# Patient Record
Sex: FEMALE | Race: OTHER | HISPANIC OR LATINO | URBAN - METROPOLITAN AREA
[De-identification: names, ages, dates, MRNs, and addresses within clinical notes are randomized per-mention and may not be internally consistent; named-entity substitution may affect disease eponyms.]

---

## 2019-02-06 ENCOUNTER — EMERGENCY (EMERGENCY)
Facility: HOSPITAL | Age: 34
LOS: 0 days | Discharge: HOME | End: 2019-02-07
Attending: EMERGENCY MEDICINE | Admitting: EMERGENCY MEDICINE

## 2019-02-06 VITALS
TEMPERATURE: 98 F | DIASTOLIC BLOOD PRESSURE: 62 MMHG | SYSTOLIC BLOOD PRESSURE: 109 MMHG | RESPIRATION RATE: 20 BRPM | HEART RATE: 59 BPM | OXYGEN SATURATION: 99 %

## 2019-02-06 DIAGNOSIS — Z3A.09 9 WEEKS GESTATION OF PREGNANCY: ICD-10-CM

## 2019-02-06 DIAGNOSIS — O20.9 HEMORRHAGE IN EARLY PREGNANCY, UNSPECIFIED: ICD-10-CM

## 2019-02-06 DIAGNOSIS — O21.9 VOMITING OF PREGNANCY, UNSPECIFIED: ICD-10-CM

## 2019-02-06 DIAGNOSIS — N93.9 ABNORMAL UTERINE AND VAGINAL BLEEDING, UNSPECIFIED: ICD-10-CM

## 2019-02-06 LAB
ALBUMIN SERPL ELPH-MCNC: 4.4 G/DL — SIGNIFICANT CHANGE UP (ref 3.5–5.2)
ALP SERPL-CCNC: 74 U/L — SIGNIFICANT CHANGE UP (ref 30–115)
ALT FLD-CCNC: 28 U/L — SIGNIFICANT CHANGE UP (ref 0–41)
ANION GAP SERPL CALC-SCNC: 17 MMOL/L — HIGH (ref 7–14)
APPEARANCE UR: CLEAR — SIGNIFICANT CHANGE UP
AST SERPL-CCNC: 18 U/L — SIGNIFICANT CHANGE UP (ref 0–41)
BACTERIA # UR AUTO: ABNORMAL /HPF
BILIRUB SERPL-MCNC: 0.3 MG/DL — SIGNIFICANT CHANGE UP (ref 0.2–1.2)
BILIRUB UR-MCNC: NEGATIVE — SIGNIFICANT CHANGE UP
BUN SERPL-MCNC: 15 MG/DL — SIGNIFICANT CHANGE UP (ref 10–20)
CALCIUM SERPL-MCNC: 9.3 MG/DL — SIGNIFICANT CHANGE UP (ref 8.5–10.1)
CHLORIDE SERPL-SCNC: 99 MMOL/L — SIGNIFICANT CHANGE UP (ref 98–110)
CO2 SERPL-SCNC: 23 MMOL/L — SIGNIFICANT CHANGE UP (ref 17–32)
COLOR SPEC: YELLOW — SIGNIFICANT CHANGE UP
COMMENT - URINE: SIGNIFICANT CHANGE UP
CREAT SERPL-MCNC: 0.5 MG/DL — LOW (ref 0.7–1.5)
DIFF PNL FLD: ABNORMAL
EPI CELLS # UR: ABNORMAL /HPF
GLUCOSE SERPL-MCNC: 122 MG/DL — HIGH (ref 70–99)
GLUCOSE UR QL: NEGATIVE — SIGNIFICANT CHANGE UP
HCG SERPL-ACNC: 378.5 MIU/ML — HIGH
KETONES UR-MCNC: ABNORMAL
LEUKOCYTE ESTERASE UR-ACNC: NEGATIVE — SIGNIFICANT CHANGE UP
NITRITE UR-MCNC: NEGATIVE — SIGNIFICANT CHANGE UP
PH UR: 6 — SIGNIFICANT CHANGE UP (ref 5–8)
POTASSIUM SERPL-MCNC: 3.8 MMOL/L — SIGNIFICANT CHANGE UP (ref 3.5–5)
POTASSIUM SERPL-SCNC: 3.8 MMOL/L — SIGNIFICANT CHANGE UP (ref 3.5–5)
PROT SERPL-MCNC: 6.8 G/DL — SIGNIFICANT CHANGE UP (ref 6–8)
PROT UR-MCNC: ABNORMAL
RBC CASTS # UR COMP ASSIST: ABNORMAL /HPF
SODIUM SERPL-SCNC: 139 MMOL/L — SIGNIFICANT CHANGE UP (ref 135–146)
SP GR SPEC: >=1.03 — SIGNIFICANT CHANGE UP (ref 1.01–1.03)
UROBILINOGEN FLD QL: 1 (ref 0.2–0.2)
WBC UR QL: SIGNIFICANT CHANGE UP /HPF

## 2019-02-06 RX ORDER — ACETAMINOPHEN 500 MG
975 TABLET ORAL ONCE
Qty: 0 | Refills: 0 | Status: COMPLETED | OUTPATIENT
Start: 2019-02-06 | End: 2019-02-06

## 2019-02-06 RX ORDER — SODIUM CHLORIDE 9 MG/ML
1000 INJECTION INTRAMUSCULAR; INTRAVENOUS; SUBCUTANEOUS ONCE
Qty: 0 | Refills: 0 | Status: COMPLETED | OUTPATIENT
Start: 2019-02-06 | End: 2019-02-06

## 2019-02-06 RX ADMIN — SODIUM CHLORIDE 2000 MILLILITER(S): 9 INJECTION INTRAMUSCULAR; INTRAVENOUS; SUBCUTANEOUS at 22:33

## 2019-02-06 RX ADMIN — Medication 975 MILLIGRAM(S): at 23:15

## 2019-02-06 NOTE — ED ADULT NURSE NOTE - EXTENSIONS OF SELF_ADULT
Health Maintenance Due   Topic Date Due   • Hepatitis B Vaccine (3 of 3 - 3-dose primary series) 2018   • Influenza Vaccine (2 of 2) 2018       Patient is due for the topics as listed above and wishes to proceed with them. Hep B at 9 months.      Vaccine Information Statement(s) was given today. This has been reviewed, questions answered, and verbal consent given by Parent for injection(s) and administration of Influenza (Inactivated).    1. Does the patient have a moderate to severe fever?  No  2. Has the patient had a serious reaction to a flu shot before?   No  3. Has the patient ever had Guillian Calvert Syndrome within 6 weeks of a previous flu shot?  No  4. Does the patient have a serious allergy to eggs?  No  5. Is the patient less that 6 months of age?  No    Patient is eligible to receive the vaccine based on all questions being answered as 'No'.        Patient tolerated without incident. See immunization grid for documentation.     None

## 2019-02-06 NOTE — ED ADULT NURSE NOTE - OBJECTIVE STATEMENT
pt is A&Ox3, ambulatory, able to make needs known and presents with C/O abdominal pain with vaginal bleeding x 1 day. PT is about 6 weeks pregnant

## 2019-02-07 VITALS
RESPIRATION RATE: 18 BRPM | HEART RATE: 74 BPM | DIASTOLIC BLOOD PRESSURE: 55 MMHG | SYSTOLIC BLOOD PRESSURE: 101 MMHG | TEMPERATURE: 97 F | OXYGEN SATURATION: 98 %

## 2019-02-07 LAB
BASOPHILS # BLD AUTO: 0.03 K/UL — SIGNIFICANT CHANGE UP (ref 0–0.2)
BASOPHILS NFR BLD AUTO: 0.4 % — SIGNIFICANT CHANGE UP (ref 0–1)
BLD GP AB SCN SERPL QL: SIGNIFICANT CHANGE UP
EOSINOPHIL # BLD AUTO: 0.12 K/UL — SIGNIFICANT CHANGE UP (ref 0–0.7)
EOSINOPHIL NFR BLD AUTO: 1.7 % — SIGNIFICANT CHANGE UP (ref 0–8)
HCT VFR BLD CALC: 34.6 % — LOW (ref 37–47)
HGB BLD-MCNC: 11.8 G/DL — LOW (ref 12–16)
IMM GRANULOCYTES NFR BLD AUTO: 0.1 % — SIGNIFICANT CHANGE UP (ref 0.1–0.3)
LACTATE SERPL-SCNC: 0.6 MMOL/L — SIGNIFICANT CHANGE UP (ref 0.5–2.2)
LYMPHOCYTES # BLD AUTO: 2.51 K/UL — SIGNIFICANT CHANGE UP (ref 1.2–3.4)
LYMPHOCYTES # BLD AUTO: 35.5 % — SIGNIFICANT CHANGE UP (ref 20.5–51.1)
MCHC RBC-ENTMCNC: 31 PG — SIGNIFICANT CHANGE UP (ref 27–31)
MCHC RBC-ENTMCNC: 34.1 G/DL — SIGNIFICANT CHANGE UP (ref 32–37)
MCV RBC AUTO: 90.8 FL — SIGNIFICANT CHANGE UP (ref 81–99)
MONOCYTES # BLD AUTO: 0.61 K/UL — HIGH (ref 0.1–0.6)
MONOCYTES NFR BLD AUTO: 8.6 % — SIGNIFICANT CHANGE UP (ref 1.7–9.3)
NEUTROPHILS # BLD AUTO: 3.79 K/UL — SIGNIFICANT CHANGE UP (ref 1.4–6.5)
NEUTROPHILS NFR BLD AUTO: 53.7 % — SIGNIFICANT CHANGE UP (ref 42.2–75.2)
NRBC # BLD: 0 /100 WBCS — SIGNIFICANT CHANGE UP (ref 0–0)
PLATELET # BLD AUTO: 228 K/UL — SIGNIFICANT CHANGE UP (ref 130–400)
RBC # BLD: 3.81 M/UL — LOW (ref 4.2–5.4)
RBC # FLD: 12 % — SIGNIFICANT CHANGE UP (ref 11.5–14.5)
TYPE + AB SCN PNL BLD: SIGNIFICANT CHANGE UP
WBC # BLD: 7.07 K/UL — SIGNIFICANT CHANGE UP (ref 4.8–10.8)
WBC # FLD AUTO: 7.07 K/UL — SIGNIFICANT CHANGE UP (ref 4.8–10.8)

## 2019-02-07 RX ORDER — SODIUM CHLORIDE 9 MG/ML
1000 INJECTION INTRAMUSCULAR; INTRAVENOUS; SUBCUTANEOUS ONCE
Qty: 0 | Refills: 0 | Status: COMPLETED | OUTPATIENT
Start: 2019-02-07 | End: 2019-02-07

## 2019-02-07 RX ADMIN — SODIUM CHLORIDE 2000 MILLILITER(S): 9 INJECTION INTRAMUSCULAR; INTRAVENOUS; SUBCUTANEOUS at 00:39

## 2019-02-07 NOTE — CONSULT NOTE ADULT - ASSESSMENT
32 yo  at around 9w pregnant by approximate LMP, w/ pregnancy of unknown location, r/o abnormal pregnancy, normal pregnancy vs. ectopic, hemodynamically and clinically stable,     -F/u bhcg and progesterone on   -Ectopic pregnancy precautions given  -Disposition of patient per ED team    Dr. Pedraza at bedside. Dr. Calzada to be made aware.     NOT FINALIZED 32 yo  at around 9w GA by approximate LMP, w/ pregnancy of unknown location, r/o abnormal pregnancy vs. normal pregnancy vs. ectopic pregnancy, hemodynamically and clinically stable,     -F/u bhcg and progesterone on   -Ectopic pregnancy precautions given  -Disposition of patient per ED team    Dr. Pedraza at bedside. Dr. Calzada aware.      number [phone]

## 2019-02-07 NOTE — ED PROVIDER NOTE - NSFOLLOWUPCLINICS_GEN_ALL_ED_FT
[facility]  OB/GYN  [address] [place] [address]  Phone: [phone]  Fax:   Follow Up Time: 1-3 Days

## 2019-02-07 NOTE — ED PROVIDER NOTE - MEDICAL DECISION MAKING DETAILS
Pt with 1 day of vaginal bleeding, currently pregnant with no documented IUP. Labs wnl and US pelvis without IUP. Pt was evaluated by OB service. They spoke with her regarding f/up for beta hcg check. Discussed anticipatory guidance and return instructions with pt.

## 2019-02-07 NOTE — ED PROVIDER NOTE - OBJECTIVE STATEMENT
33 yr F, A1, about 9 wks pregnant with LMP being end of 2018, with complaints of suprapubic abd pain, associated nausea and nbnb vomiting. Bleeding started about 14 hrs prior, was very light and then turned into heavy bleeding. No CP, SOB, lightheadedness or LOC. Pt has not had IUP documented.   Hungarian speaker,  ID: [id number]

## 2019-02-07 NOTE — CONSULT NOTE ADULT - SUBJECTIVE AND OBJECTIVE BOX
LUIS MIGUEL DAVIS   33y   Female   [id number]    Chief Complaint: 8 am some discharge that at 1 with pain, intermittent sharp worse 8/10 intensity, clinic that diagnosed her     HPI:    MEDICATIONS  (STANDING):    MEDICATIONS  (PRN):      Allergies    No Known Allergies    Intolerances        PAST MEDICAL & SURGICAL HISTORY:      OB/GYN HISTORY:   LMP:            Cycle Length:              Days Flow:                                       Gyn: denies history of abnormal pap, STI, ovarian cysts, or uterine fibroids                                      Obstetric:  G  P                                        Last Pap smear:                                        Last mammogram:                                       Last Colonoscopy:    FAMILY HISTORY:      SOCIAL HISTORY: Denies cigarette use, alcohol use, or illicit drug use    REVIEW OF SYSTEMS:    CONSTITUTIONAL: No weakness, fevers or chills  EYES/ENT: No visual changes;  No vertigo or throat pain   NECK: No pain or stiffness  RESPIRATORY: No cough, wheezing, hemoptysis; No shortness of breath  CARDIOVASCULAR: No chest pain or palpitations  GASTROINTESTINAL: No abdominal or epigastric pain. No nausea, vomiting, or hematemesis; No diarrhea or constipation. No melena or hematochezia.  GENITOURINARY: No dysuria, frequency or hematuria  NEUROLOGICAL: No numbness or weakness  SKIN: No itching, rashes  All other negative        Vital Signs Last 24 Hrs  T(C): 36.7 (06 Feb 2019 19:49), Max: 36.7 (06 Feb 2019 19:49)  T(F): 98 (06 Feb 2019 19:49), Max: 98 (06 Feb 2019 19:49)  HR: 59 (06 Feb 2019 19:49) (59 - 59)  BP: 109/62 (06 Feb 2019 19:49) (109/62 - 109/62)  BP(mean): --  RR: 20 (06 Feb 2019 19:49) (20 - 20)  SpO2: 99% (06 Feb 2019 19:49) (99% - 99%)    Physical Exam:  Constitutional: AAOx3, NAD  Breasts: Normal, no masses, nipple discharge, or tenderness bilaterally  Respiratory: CTAB  Cardiovascular: NL S1/S2  Gastrointestinal: Soft, nontender, nondistended, no rebound, guarding, or rigidity  Pelvic: Normal vulva, normal vagina, no bleeding, Cervix normal, closed, no bleeding, no abnormal discharge, Uterus anteverted, normal sized, no fundal tenderness, no adnexal masses or tenderness  Rectal:    LABS:                        11.8   7.07  )-----------( 228      ( 07 Feb 2019 00:17 )             34.6     HCG Quantitative, Serum: 378.5 mIU/mL (02-06-19 @ 21:14)    Antibody Screen: NEG (02-06-19 @ 21:14)    02-06    139  |  99  |  15  ----------------------------<  122<H>  3.8   |  23  |  0.5<L>    Ca    9.3      06 Feb 2019 21:14    TPro  6.8  /  Alb  4.4  /  TBili  0.3  /  DBili  x   /  AST  18  /  ALT  28  /  AlkPhos  74  02-06      Urinalysis Basic - ( 06 Feb 2019 21:14 )    Color: Yellow / Appearance: Clear / SG: >=1.030 / pH: x  Gluc: x / Ketone: Trace  / Bili: Negative / Urobili: 1.0   Blood: x / Protein: Trace / Nitrite: Negative   Leuk Esterase: Negative / RBC: 11-25 /HPF / WBC 3-5 /HPF   Sq Epi: x / Non Sq Epi: Few /HPF / Bacteria: Moderate /HPF          RADIOLOGY & ADDITIONAL STUDIES: LUIS MIGUEL DAVIS   33y   Female   [id number]    Chief Complaint: Vaginal bleeding at 9w GA    HPI: 34 yo  at around 9w pregnant by approximate LMP of  here for vaginal bleeding and abdominal cramping. At 7 am pt started having orange, pinkish discharge. At 1 pm, her discharge turned into bleeding and she started having intermittent sharp lower pelvic pain that is radiating top her back. When her pain is worse, it is 8/10 intensity. Pt found out she was pregnant a couple of weeks ago when she went for a check up at [facility]. When she started having this pain and bleeding, she called the same clinic and was told to come to the ER. Desired and planned pregnancy. Pt denies HA, N/V, abnormal vaginal discharge, dysuria, frequency, urgency, palpitations, SOB, chest pain, sore throat, cough, sick contact and recent travel.     MEDICATIONS:  None    ALLERGIES:  NKDA    PAST MEDICAL & SURGICAL HISTORY:  H/o dilation and curettage   H/o appendectomy (laparoscopic)  H/o cholecystectomy (laparoscopic)    OB/GYN HISTORY:   LMP: End of , pt not sure  Gyn: denies history of abnormal pap, STI, ovarian cysts, or uterine fibroids  Obstetric: ; FT  x2, no complications; SABx 1 w/ D&C    FAMILY HISTORY:  No pertinent family history    SOCIAL HISTORY:   Denies cigarette use, alcohol use, or illicit drug use    REVIEW OF SYSTEMS:  CONSTITUTIONAL: No weakness, fevers or chills  EYES/ENT: No visual changes;  No vertigo or throat pain   NECK: No pain or stiffness  RESPIRATORY: No cough, wheezing, hemoptysis; No shortness of breath  CARDIOVASCULAR: No chest pain or palpitations  GASTROINTESTINAL: No abdominal or epigastric pain. No nausea, vomiting, or hematemesis; No diarrhea or constipation. No melena or hematochezia.  GENITOURINARY: No dysuria, frequency or hematuria  NEUROLOGICAL: No numbness or weakness  SKIN: No itching, rashes  All other negative    Vital Signs Last 24 Hrs  T(C): 36.7 (2019 19:49), Max: 36.7 (2019 19:49)  T(F): 98 (2019 19:49), Max: 98 (2019 19:49)  HR: 59 (2019 19:49) (59 - 59)  BP: 109/62 (2019 19:49) (109/62 - 109/62)  RR: 20 (2019 19:49) (20 - 20)  SpO2: 99% (2019 19:49) (99% - 99%)    Physical Exam:  Constitutional: AAOx3, NAD  Respiratory: CTAB  Cardiovascular: NL S1/S2  Gastrointestinal: Soft, mildly tender in the middle lower pelvic area otherwise nontender, nondistended, no rebound, guarding, or rigidity  Pelvic: Normal vulva, normal vagina, no bleeding, speculum inserted, cervix normal, closed, no active bleeding, small amount of brown bloody discharge, Uterus anteverted, 8-9w sized, no fundal tenderness, no adnexal masses or tenderness    LABS:                        11.8   7.07  )-----------( 228      ( 2019 00:17 )             34.6     HCG Quantitative, Serum: 378.5 mIU/mL (19 @ 21:14)    Antibody Screen: NEG (19 @ 21:14)        139  |  99  |  15  ----------------------------<  122<H>  3.8   |  23  |  0.5<L>    Ca    9.3      2019 21:14    TPro  6.8  /  Alb  4.4  /  TBili  0.3  /  DBili  x   /  AST  18  /  ALT  28  /  AlkPhos  74      Urinalysis Basic - ( 2019 21:14 )    Color: Yellow / Appearance: Clear / SG: >=1.030 / pH: x  Gluc: x / Ketone: Trace  / Bili: Negative / Urobili: 1.0   Blood: x / Protein: Trace / Nitrite: Negative   Leuk Esterase: Negative / RBC: 11-25 /HPF / WBC 3-5 /HPF   Sq Epi: x / Non Sq Epi: Few /HPF / Bacteria: Moderate /HPF    RADIOLOGY & ADDITIONAL STUDIES:    PROCEDURE: Transabdominal and transvaginal ultrasound of the pelvis was   performed, including Doppler.    FINDINGS:    UTERUS: Anteverted measuring 8 x 5 x 6.3 cm, with normal echogenicity and   morphology. The endometrial echo complex measures 0.9 cm, which is normal   in thickness. No IUP is seen.    RIGHT OVARY: measures 2.7 x 1.8 x 2.2 cm, and is unremarkable. Doppler   flow is demonstrated to the right ovary.     LEFT OVARY: measures 2.3 x 2.6 x 1.6 cm, and is unremarkable. Doppler   flow is demonstrated to the left ovary.     OTHER: No free fluid in the pelvis. LUIS MIGUEL DAVIS   33y   Female   [id number]    Chief Complaint: Vaginal bleeding at 9w GA    HPI: 32 yo  at around 9w pregnant by approximate LMP of  here for vaginal bleeding and abdominal cramping. At 7 am pt started having orange, pinkish discharge. At 1 pm, her discharge turned into bleeding and she started having intermittent sharp lower pelvic pain that is radiating to her back. When her pain is worse, it is 8/10 intensity. Pt found out she was pregnant a couple of weeks ago when she went for a check up at [facility]. When she started having this pain and bleeding, she called the same clinic and was told to come to the ER. Desired and planned pregnancy. Pt denies HA, N/V, abnormal vaginal discharge, dysuria, frequency, urgency, palpitations, SOB, chest pain, sore throat, cough, sick contact and recent travel.     MEDICATIONS:  None    ALLERGIES:  NKDA    PAST MEDICAL & SURGICAL HISTORY:  H/o dilation and curettage   H/o appendectomy (laparoscopic)  H/o cholecystectomy (laparoscopic)    OB/GYN HISTORY:   LMP: End of , pt not sure  Gyn: denies history of abnormal pap, STI, ovarian cysts, or uterine fibroids  Obstetric: ; FT  x2, no complications; SABx 1 w/ D&C    FAMILY HISTORY:  No pertinent family history    SOCIAL HISTORY:   Denies cigarette use, alcohol use, or illicit drug use    REVIEW OF SYSTEMS:  CONSTITUTIONAL: No weakness, fevers or chills  EYES/ENT: No visual changes;  No vertigo or throat pain   NECK: No pain or stiffness  RESPIRATORY: No cough, wheezing, hemoptysis; No shortness of breath  CARDIOVASCULAR: No chest pain or palpitations  GASTROINTESTINAL: No abdominal or epigastric pain. No nausea, vomiting, or hematemesis; No diarrhea or constipation. No melena or hematochezia.  GENITOURINARY: No dysuria, frequency or hematuria  NEUROLOGICAL: No numbness or weakness  SKIN: No itching, rashes  All other negative    Vital Signs Last 24 Hrs  T(C): 36.7 (2019 19:49), Max: 36.7 (2019 19:49)  T(F): 98 (2019 19:49), Max: 98 (2019 19:49)  HR: 59 (2019 19:49) (59 - 59)  BP: 109/62 (2019 19:49) (109/62 - 109/62)  RR: 20 (2019 19:49) (20 - 20)  SpO2: 99% (2019 19:49) (99% - 99%)    Physical Exam:  Constitutional: AAOx3, NAD  Respiratory: CTAB  Cardiovascular: NL S1/S2  Gastrointestinal: Soft, mildly tender in the middle lower pelvic area otherwise nontender, nondistended, no rebound, guarding, or rigidity  Pelvic: Normal vulva, normal vagina, no bleeding, speculum inserted, cervix normal, closed, no active bleeding, small amount of brown bloody discharge, Uterus anteverted, 8-9w sized, no fundal tenderness, no adnexal masses or tenderness    LABS:                        11.8   7.07  )-----------( 228      ( 2019 00:17 )             34.6     HCG Quantitative, Serum: 378.5 mIU/mL (19 @ 21:14)    Antibody Screen: NEG (19 @ 21:14)        139  |  99  |  15  ----------------------------<  122<H>  3.8   |  23  |  0.5<L>    Ca    9.3      2019 21:14    TPro  6.8  /  Alb  4.4  /  TBili  0.3  /  DBili  x   /  AST  18  /  ALT  28  /  AlkPhos  74      Urinalysis Basic - ( 2019 21:14 )    Color: Yellow / Appearance: Clear / SG: >=1.030 / pH: x  Gluc: x / Ketone: Trace  / Bili: Negative / Urobili: 1.0   Blood: x / Protein: Trace / Nitrite: Negative   Leuk Esterase: Negative / RBC: 11-25 /HPF / WBC 3-5 /HPF   Sq Epi: x / Non Sq Epi: Few /HPF / Bacteria: Moderate /HPF    RADIOLOGY & ADDITIONAL STUDIES:    PROCEDURE: Transabdominal and transvaginal ultrasound of the pelvis was   performed, including Doppler.    FINDINGS:    UTERUS: Anteverted measuring 8 x 5 x 6.3 cm, with normal echogenicity and   morphology. The endometrial echo complex measures 0.9 cm, which is normal   in thickness. No IUP is seen.    RIGHT OVARY: measures 2.7 x 1.8 x 2.2 cm, and is unremarkable. Doppler   flow is demonstrated to the right ovary.     LEFT OVARY: measures 2.3 x 2.6 x 1.6 cm, and is unremarkable. Doppler   flow is demonstrated to the left ovary.     OTHER: No free fluid in the pelvis.

## 2019-02-07 NOTE — ED PROVIDER NOTE - PHYSICAL EXAMINATION
CONSTITUTIONAL: Well-developed; well-nourished; in no acute distress, nontoxic appearing  SKIN: skin exam is warm and dry,  HEAD: Normocephalic; atraumatic.  EYES: PERRL, 3 mm bilateral, no nystagmus, EOM intact; conjunctiva and sclera clear.  ENT: MMM, no nasal congestion  NECK: Supple; non tender.+ full passive ROM in all directions. No JVD  CARD: S1, S2 normal, no murmur  RESP: No wheezes, rales or rhonchi. Good air movement bilaterally  ABD: soft; non-distended; + suprapubic ttp. No Rebound, No guarding  GYN: Speculum exam with dark blood in the vaginal vault, bimanual exam with closed cervical os.   EXT: Normal ROM. No cyanosis or edema. Dp Pulses intact.   NEURO: awake, alert, following commands, oriented, grossly unremarkable. No Focal deficits. GCS 15.   PSYCH: Cooperative, appropriate.

## 2019-02-07 NOTE — ED PROVIDER NOTE - PROGRESS NOTE DETAILS
Spoke with GYN resident, will come and evaluate pt. Pt was evaluated by GYN service. She was place on their beta hcg list and given a script to go back to the lab. Pt will be d/umesh GYN exam chaperoned by nurse Lu

## 2019-02-07 NOTE — ED PROVIDER NOTE - NS ED ROS FT
Review of Systems    Constitutional: (-) fever  Cardiovascular: (-) chest pain, (-) syncope  Respiratory: (-) cough, (-) shortness of breath  Gastrointestinal: (-) vomiting, (-) diarrhea, (-) abdominal pain  GYN: As per HPI  Musculoskeletal: (-) neck pain, (-) back pain, (-) joint pain  Integumentary: (-) rash, (-) edema  Neurological: (-) headache, (-) altered mental status    Except as documented in the HPI, all other systems are negative.

## 2019-02-08 LAB
CULTURE RESULTS: NO GROWTH — SIGNIFICANT CHANGE UP
SPECIMEN SOURCE: SIGNIFICANT CHANGE UP

## 2019-02-11 ENCOUNTER — OUTPATIENT (OUTPATIENT)
Dept: OUTPATIENT SERVICES | Facility: HOSPITAL | Age: 34
LOS: 1 days | Discharge: HOME | End: 2019-02-11

## 2019-02-11 DIAGNOSIS — Z32.01 ENCOUNTER FOR PREGNANCY TEST, RESULT POSITIVE: ICD-10-CM

## 2019-02-11 NOTE — CHART NOTE - NSCHARTNOTEFT_GEN_A_CORE
Spoke to pt today regarding need to go for repeat bhcg, Pt reports she was busy and unable to make it to lab until Wednesday. Explained importance of blood work, given strict bleeding precautions, informed to call floor if experiencing heavy bleeding or pain.

## 2019-02-12 NOTE — CHART NOTE - NSCHARTNOTEFT_GEN_A_CORE
Mrs. Mccord presented to the ED 19    HPI: 32 yo  at  around 9w by approximate LMP of end  presented to ED w/ vaginal bleeding and abdominal cramping. @0700 that morning pt started having orange, pinkish discharge. @1300 her discharge become bloody, and she began having intermittent sharp lower pelvic pain that radiated to back. Pain 8/10 at worst. Pt found out she was pregnant a couple of weeks prior when she went for check up at [facility]. Planned and desired pregnancy. Denied HA, N/V, abnormal vaginal discharge, dysuria, frequency, urgency, palpitations, SOB, chest pain, sore throat, cough, sick contact and recent travel.     Labs:  : 7.07>11.8/34.6<228, 139/3.8/99/23/15/0.5<122, AST/ALT 18/28, UA trace ketones, trace protein, 1.0 urobilinogen, mod blood, few epithelial cells, RBC 11-25, mod bacteria, bhcg 378.5, O pos     : 22.8      Imagin/6 TVUS: UTERUS: Anteverted measuring 8 x 5 x 6.3 cm, with normal echogenicity and morphology. The endometrial echo complex measures 0.9 cm, which is normal in thickness. No IUP is seen. RIGHT OVARY: measures 2.7 x 1.8 x 2.2 cm, and is unremarkable. Doppler   flow is demonstrated to the right ovary. LEFT OVARY: measures 2.3 x 2.6 x 1.6 cm, and is unremarkable. Doppler flow is demonstrated to the left ovary. OTHER: No free fluid in the pelvis.        Ddx at this point was abnormal pregnancy vs normal IUP vs ectopic    Pt went for repeat bhcg , dropped from 378 to 22. Pt reports resolution of pain and bleeding, informed of likelihood of SAB. She was encouraged to make appt to follow up outpatient in the near future. Ddx now SAB. GYN will no longer continue to follow.    Dr. Herrera and Dr. Fabian aware.